# Patient Record
Sex: MALE | Race: WHITE
[De-identification: names, ages, dates, MRNs, and addresses within clinical notes are randomized per-mention and may not be internally consistent; named-entity substitution may affect disease eponyms.]

---

## 2018-05-24 ENCOUNTER — HOSPITAL ENCOUNTER (OUTPATIENT)
Dept: HOSPITAL 45 - C.GI | Age: 73
Discharge: HOME | End: 2018-05-24
Attending: SPECIALIST
Payer: COMMERCIAL

## 2018-05-24 VITALS — OXYGEN SATURATION: 95 % | SYSTOLIC BLOOD PRESSURE: 100 MMHG | DIASTOLIC BLOOD PRESSURE: 72 MMHG | HEART RATE: 53 BPM

## 2018-05-24 VITALS
HEIGHT: 69.02 IN | HEIGHT: 69.02 IN | WEIGHT: 170.35 LBS | BODY MASS INDEX: 25.23 KG/M2 | BODY MASS INDEX: 25.23 KG/M2 | WEIGHT: 170.35 LBS

## 2018-05-24 DIAGNOSIS — K62.5: Primary | ICD-10-CM

## 2018-05-24 DIAGNOSIS — Z86.718: ICD-10-CM

## 2018-05-24 DIAGNOSIS — Z91.041: ICD-10-CM

## 2018-05-24 DIAGNOSIS — Z88.1: ICD-10-CM

## 2018-05-24 DIAGNOSIS — Z87.891: ICD-10-CM

## 2018-05-24 NOTE — GI REPORT
Patient Name: Sherif Javier

Procedure Date: 5/24/2018 2:49 PM

MRN: [id number]

Account Number: [id number]

YOB: 1945

Admit Type: Outpatient

Age: 72

Gender: Male

Attending MD: Marlon Cordova MD

Procedure:            Flexible Sigmoidoscopy

Providers:            Marlon Cordova MD

Referring MD:         Renetta Zimmer

Indications:          Hematochezia

Medicines:            Propofol per Anesthesia

Complications:        No immediate complications. Estimated blood loss: 

                      Minimal.

Estimated Blood Loss: Estimated blood loss was minimal.

Procedure:            Pre-Anesthesia Assessment:

                      - Prior to the procedure, a History and Physical was 

                      performed, and patient medications and allergies were 

                      reviewed. The patient's tolerance of previous 

                      anesthesia was also reviewed. The risks and benefits of 

                      the procedure and the sedation options and risks were 

                      discussed with the patient. All questions were 

                      answered, and informed consent was obtained. Prior 

                      Anticoagulants: The patient has taken no previous 

                      anticoagulant or antiplatelet agents. ASA Grade 

                      Assessment: II - A patient with mild systemic disease. 

                      After reviewing the risks and benefits, the patient was 

                      deemed in satisfactory condition to undergo the 

                      procedure.

                      After obtaining informed consent, the endoscope was 

                      passed under direct vision. Throughout the procedure, 

                      the patient's blood pressure, pulse, and oxygen 

                      saturations were monitored continuously. The 

                      gastroscope was introduced through the anus and 

                      advanced to 4 cm from the anal verge. The flexible 

                      sigmoidoscopy was accomplished without difficulty. The 

                      patient tolerated the procedure well. The quality of 

                      the bowel preparation was good.

Findings:

     The perianal and digital rectal examinations were normal. Pertinent 

     negatives include narrowed anal canal without obvious ulceration.

     A diffuse area of moderately congested, erythematous, eroded and friable 

     (with contact bleeding) mucosa was found in the rectum. Biopsies were 

     taken with a cold forceps for histology. Estimated blood loss was 

     minimal. Verification of patient identification for the specimen was 

     done by the physician and technician using the patient's name and 

     medical record number.

Impression:           - Congested, erythematous, eroded and friable (with 

                      contact bleeding) mucosa in the rectum. Biopsied.

                      - There appears to be a 4 cm blind-ended distal rectual 

                      segment present. This appears to have colitis that may 

                      represent recurrent proctitis ( Hx UC) vs diversion 

                      proctitis.

Recommendation:       - Discharge patient to home (ambulatory).

                      - Resume regular diet.

                      - Use Canasa 1000 mg suppository 1 per rectum QHS.

                      - Return to referring physician as previously scheduled.

MD Marlon Paetl MD

5/24/2018 5:38:33 PM

This report has been signed electronically.

Note Initiated On: 5/24/2018 2:49 PM

Number of Addenda: 0

     I attest to the content of the Intraoperative Record and orders 

     documented therein, exceptions below



{[id number]}

## 2018-05-24 NOTE — ENDO HISTORY AND PHYSICAL
History & Physical


Date of Service:


May 24, 2018.


Chief Complaint:


RECTAL BLEEDING


Referring Physician:


DR. MARINA


History of Present Illness


rectal bleeding; hx UC





Past Surgical History


Hx Cardiac Surgery:  No


Hx Internal Defibrillator:  No


Hx Pacemaker:  No


Hx Abdominal Surgery:  Yes (APPY, COLON REMOVAL (EMERGENCY SURGERY) ILEOSTOMY, 

ABDOMINAL WALL I&D)


Hx of Implantable Prosthesis:  No


Hx Post-Op Nausea and Vomiting:  No


Hx Cancer Surgery:  No


Hx Thoracic Surgery:  No


Hx Orthopedic:  No


Hx Urinary Tract Surgery:  No





Family History


None





Social History


Smoking Status:  Former Smoker


Hx Substance Use:  No


Hx Alcohol Use:  No





Allergies


Coded Allergies:  


     Ciprofloxacin (Verified  Allergy, Unknown, SEIZURES, 5/14/18)


     Gluten (Verified  Allergy, Unknown, DIARRHEA AND WT LOSS, 5/14/18)


     Iodinated Diagnostic Agents (Verified  Allergy, Unknown, HEADACHE, 5/14/18)


     Levofloxacin (Verified  Allergy, Unknown, MUSCLE PROBLEMS, 5/14/18)





Current Medications





Reported Home Medications








 Medications  Dose


 Route/Sig


 Max Daily Dose Days Date Category


 


 Calcium &


 Magnesium


  (Calcium W/


 Magnesium) 1 Tab


 Tab  1 Tab


 PO DAILY


    5/14/18 Reported


 


 Multivitamin


  (Multivitamins)


 Tab  1 Tab


 PO DAILY


    5/14/18 Reported


 


 Fiber Complete


  (Fiber) 62.5 Mg


 Tab  2 Tab


 PO BID


    5/14/18 Reported


 


 Imodium


  (Loperamide HCl)


 2 Mg Cap  2 Mg


 PO QID


    5/14/18 Reported


 


 Flomax


  (Tamsulosin Hcl)


 0.4 Mg Cap  0.4 Mg


 PO QAM


    5/14/18 Reported











Vital Signs


Weight (Kilograms):  77.27


Height (Feet):  5


Height (Inches):  9











  Date Time  Temp Pulse Resp B/P (MAP) Pulse Ox O2 Delivery O2 Flow Rate FiO2


 


5/24/18 12:55 37.4 71 16 122/82 (95) 94 Room Air  











Physical Exam


General Appearance:  WD/WN, no apparent distress


Respiratory/Chest:  


   Auscultation:  breath sounds normal


Cardiovascular:  


   Heart Auscultation:  RRR


Abdomen:  


   Bowel Sounds:  normal


   Inspection & Palpation:  soft, non-distended, no tenderness, guarding & 

rebound





Assessment and Plan


FS with bx

## 2018-05-24 NOTE — DISCHARGE INSTRUCTIONS
Endoscopy Patient Instructions


Date / Procedure(s) Performed


May 24, 2018.


Flex Sig





Allergy Information


Coded Allergies:  


     Ciprofloxacin (Verified  Allergy, Unknown, SEIZURES, 5/14/18)


     Gluten (Verified  Allergy, Unknown, DIARRHEA AND WT LOSS, 5/14/18)


     Iodinated Diagnostic Agents (Verified  Allergy, Unknown, HEADACHE, 5/14/18)


     Levofloxacin (Verified  Allergy, Unknown, MUSCLE PROBLEMS, 5/14/18)





Discharge Date / Findings


May 24, 2018.


proctitis





Medication Instructions


Restart Stopped Medication(s):





Reported Home Medications








 Medications  Dose


 Route/Sig


 Max Daily Dose Days Date Category


 


 Calcium &


 Magnesium


  (Calcium W/


 Magnesium) 1 Tab


 Tab  1 Tab


 PO DAILY


    5/14/18 Reported


 


 Multivitamin


  (Multivitamins)


 Tab  1 Tab


 PO DAILY


    5/14/18 Reported


 


 Fiber Complete


  (Fiber) 62.5 Mg


 Tab  2 Tab


 PO BID


    5/14/18 Reported


 


 Imodium


  (Loperamide HCl)


 2 Mg Cap  2 Mg


 PO QID


    5/14/18 Reported


 


 Flomax


  (Tamsulosin Hcl)


 0.4 Mg Cap  0.4 Mg


 PO QAM


    5/14/18 Reported





Canasa supp one rectally daily





Reported Home Medications








 Medications  Dose


 Route/Sig


 Max Daily Dose Days Date Category


 


 Calcium &


 Magnesium


  (Calcium W/


 Magnesium) 1 Tab


 Tab  1 Tab


 PO DAILY


    5/14/18 Reported


 


 Multivitamin


  (Multivitamins)


 Tab  1 Tab


 PO DAILY


    5/14/18 Reported


 


 Fiber Complete


  (Fiber) 62.5 Mg


 Tab  2 Tab


 PO BID


    5/14/18 Reported


 


 Imodium


  (Loperamide HCl)


 2 Mg Cap  2 Mg


 PO QID


    5/14/18 Reported


 


 Flomax


  (Tamsulosin Hcl)


 0.4 Mg Cap  0.4 Mg


 PO QAM


    5/14/18 Reported





canasa supp 1 rectally daily at bedtime





Provider Instructions





Activity Restrictions





-  No exercising or heavy lifting for 24 hours. 


-  Do not drink alcohol the day of the procedure.


-  Do not drive a car or operate machinery until the day after the procedure.


-  Do not make any important decisions or sign important papers in 24 hours 

after the procedure.





Following Day:





-  Return to full activity which may include returning to work/school.





Diet





Start your diet with liquids and light foods (jello, soup, juice, toast).  Then 

eat your usual diet if not nauseated.





Treatment For Common After Affects





For mild abdominal pain, bloating, or excessive gas:





-  Rest


-  Eat lightly


-  Lie on right side





Follow-Up Information


Follow-up with DR. MARINA as scheduled





Anesthesia Information





What You Should Know





You have had a procedure that required some medicine to reduce anxiety and 

discomfort. This treatment is called moderate sedation.  


After receiving the treatment, you may be sleepy, but you will be able to 

breathe on your own.  The effects of the treatment may last for several hours.








Follow these instructions along with Activity/Diet recommendations noted above:





*  Do NOT do anything where dizziness or clumsiness would be dangerous.





*  Rest quietly at home today, then you can be up and about tomorrow.





*  Have a responsible person stay with you the rest of today.





*  You may have had an I.V. today.  If so, you may take the dressing off later 

today.





Recommendations


 


Call your doctor if:





*  Trouble breathing 





*  Continuous vomiting for more than 24 hours








*  Temperature above 101 degrees





*  Severe abdominal pain or bloating





*  Pain not relieved by pain medicine ordered





*  There is increased drainage or redness from any incision





*  A large amount of rectal bleeding greater than 2-3 tablespoons. 


   (If you had a polyp/s removed or have hemorrhoids, a small amount of blood -


    from the rectum is to be expected.)





*  You have any unanswered questions or concerns.








IN THE EVENT OF A SERIOUS EMERGENCY, GO TO THE NEAREST EMERGENCY ROOM








       Your discharge instructions were prepared by provider Marlon Cordova.





 Patient Instructions Signature Page














Sherif Javier 











Patient (or Guardian) Signature/Date:____________________________________ I 

have read and understand the instructions given to me by my caregivers.








Caregiver/RN/Doctor Signature/Date:____________________________________











The above-named patient and/or guardian has received patient instructions on 

this date.





























+  Original Patient Signature Page (only) stays with chart.  Please make copy 

for patient.

## 2018-05-24 NOTE — ANESTHESIOLOGY PROGRESS NOTE
Anesthesia Post Op Note


Date & Time


May 24, 2018 at 15:28





Vital Signs


Pain Intensity:  0





Vital Signs Past 12 Hours








  Date Time  Temp Pulse Resp B/P (MAP) Pulse Ox O2 Delivery O2 Flow Rate FiO2


 


5/24/18 15:10  58 16 90/54 (66) 91 Room Air  


 


5/24/18 12:55 37.4 71 16 122/82 (95) 94 Room Air  











Notes


Mental Status:  alert / awake / arousable, participated in evaluation


Pt Amnestic to Procedure:  Yes


Nausea / Vomiting:  adequately controlled


Pain:  adequately controlled


Airway Patency, RR, SpO2:  stable & adequate


BP & HR:  stable & adequate


Hydration State:  stable & adequate


Anesthetic Complications:  no major complications apparent

## 2018-08-07 ENCOUNTER — HOSPITAL ENCOUNTER (OUTPATIENT)
Dept: HOSPITAL 45 - C.CTS | Age: 73
Discharge: HOME | End: 2018-08-07
Attending: COLON & RECTAL SURGERY
Payer: COMMERCIAL

## 2018-08-07 DIAGNOSIS — N40.0: ICD-10-CM

## 2018-08-07 DIAGNOSIS — K51.90: Primary | ICD-10-CM

## 2018-08-07 DIAGNOSIS — K43.9: ICD-10-CM

## 2023-10-05 NOTE — DIAGNOSTIC IMAGING REPORT
ABD/PELVIS IV AND ORAL CONT



CT DOSE: 394.48 mGy.cm



HISTORY: Pain  RECTAL PAIN, POSSIBLE RECTAL STRICTURE



TECHNIQUE: Multiaxial CT images of the abdomen and pelvis were performed

following the use of intravenous and oral contrast.  A dose lowering technique

was utilized adhering to the principles of ALARA.





COMPARISON STUDY: None.



FINDINGS: Lung bases are clear. Liver is uniform. No evidence for gallbladder

distention. Pancreas is unremarkable.



The adrenal glands are within normal limits. Kidneys enhance uniformly. There is

a 6 mm cortical cyst lateral aspect left mid kidney. No evidence for

hydronephrosis.



Consistent with a large ventral hernia containing multiple bowel loops. Also

contains components of the gastric antrum with this considered to be a

nonobstructive finding.



Operative findings consistent with a prior colectomy. Lower right anterior

abdominal wall ostomy. This appears patent. Bowel pattern overall is

nonobstructive.



No significant abdominal pelvic or inguinal adenopathy.



Bladder is midline. Prostate is enlarged. Residual low sigmoid appears

unremarkable.



IMPRESSION: 



1. Operative changes consistent with a prior subtotal colectomy and right

anterior ostomy.

2. Large ventral hernia containing multiple loops of nonobstructive bowel as

well as components of the distal gastric antrum. This again is nonobstructive.

3. Prostate enlargement.

4. Unremarkable appearance of the sigmoid and rectal regions within the

limitations of an unenhanced scan. 









The above report was generated using voice recognition software.  It may contain

grammatical, syntax or spelling errors.







Electronically signed by:  Terrance Santiago M.D.

8/7/2018 11:27 AM



Dictated Date/Time:  8/7/2018 11:22 AM [place] Counseling: I discussed with the patient the risks of guselkumab including but not limited to immunosuppression, serious infections, and drug reactions.  The patient understands that monitoring is required including a PPD at baseline and must alert us or the primary physician if symptoms of infection or other concerning signs are noted.